# Patient Record
Sex: MALE | Race: WHITE | URBAN - METROPOLITAN AREA
[De-identification: names, ages, dates, MRNs, and addresses within clinical notes are randomized per-mention and may not be internally consistent; named-entity substitution may affect disease eponyms.]

---

## 2017-09-26 ENCOUNTER — NURSING HOME VISIT (OUTPATIENT)
Dept: GERIATRICS | Facility: CLINIC | Age: 82
End: 2017-09-26
Payer: COMMERCIAL

## 2017-09-26 VITALS
WEIGHT: 227.7 LBS | SYSTOLIC BLOOD PRESSURE: 126 MMHG | DIASTOLIC BLOOD PRESSURE: 88 MMHG | HEART RATE: 77 BPM | RESPIRATION RATE: 18 BRPM | TEMPERATURE: 98.4 F

## 2017-09-26 DIAGNOSIS — F17.291 OTHER TOBACCO PRODUCT NICOTINE DEPENDENCE IN REMISSION: ICD-10-CM

## 2017-09-26 DIAGNOSIS — R39.14 BENIGN PROSTATIC HYPERPLASIA WITH INCOMPLETE BLADDER EMPTYING: ICD-10-CM

## 2017-09-26 DIAGNOSIS — E11.49 CONTROLLED TYPE 2 DIABETES MELLITUS WITH OTHER NEUROLOGIC COMPLICATION: Primary | ICD-10-CM

## 2017-09-26 DIAGNOSIS — I10 BENIGN ESSENTIAL HYPERTENSION: ICD-10-CM

## 2017-09-26 DIAGNOSIS — Z86.73 HISTORY OF CVA (CEREBROVASCULAR ACCIDENT): ICD-10-CM

## 2017-09-26 DIAGNOSIS — K59.01 SLOW TRANSIT CONSTIPATION: ICD-10-CM

## 2017-09-26 DIAGNOSIS — N40.1 BENIGN PROSTATIC HYPERPLASIA WITH INCOMPLETE BLADDER EMPTYING: ICD-10-CM

## 2017-09-26 DIAGNOSIS — F51.02 ADJUSTMENT INSOMNIA: ICD-10-CM

## 2017-09-26 DIAGNOSIS — I69.919 COGNITIVE DEFICITS AS LATE EFFECT OF CEREBROVASCULAR DISEASE: ICD-10-CM

## 2017-09-26 PROBLEM — I63.81 RIGHT-SIDED LACUNAR INFARCTION (H): Status: ACTIVE | Noted: 2017-09-13

## 2017-09-26 PROBLEM — N17.9 ACUTE RENAL FAILURE (ARF) (H): Status: ACTIVE | Noted: 2017-09-13

## 2017-09-26 PROBLEM — R53.1 WEAKNESS: Status: ACTIVE | Noted: 2017-09-13

## 2017-09-26 PROCEDURE — 99310 SBSQ NF CARE HIGH MDM 45: CPT | Performed by: NURSE PRACTITIONER

## 2017-09-26 RX ORDER — GUAIFENESIN AND DEXTROMETHORPHAN HYDROBROMIDE 100; 10 MG/5ML; MG/5ML
5-10 SOLUTION ORAL EVERY 4 HOURS PRN
COMMUNITY
End: 2017-10-26

## 2017-09-26 RX ORDER — NICOTINE 21 MG/24HR
1 PATCH, TRANSDERMAL 24 HOURS TRANSDERMAL EVERY 24 HOURS
COMMUNITY
End: 2017-10-10 | Stop reason: DRUGHIGH

## 2017-09-26 RX ORDER — CHOLECALCIFEROL (VITAMIN D3) 25 MCG
2000 CAPSULE ORAL DAILY
COMMUNITY

## 2017-09-26 RX ORDER — POLYETHYLENE GLYCOL 3350 17 G/17G
17 POWDER, FOR SOLUTION ORAL DAILY
COMMUNITY

## 2017-09-26 RX ORDER — UREA 10 %
500 LOTION (ML) TOPICAL DAILY
COMMUNITY
End: 2017-10-06

## 2017-09-26 RX ORDER — TAMSULOSIN HYDROCHLORIDE 0.4 MG/1
0.4 CAPSULE ORAL DAILY
COMMUNITY
End: 2017-10-26

## 2017-09-26 NOTE — PROGRESS NOTES
"[place] [facility]  PRIMARY CARE PROVIDER AND CLINIC:  No primary care provider on file. No primary physician on file.- Pt unable to tell me who his PCP is or clinic.   Appears from Care everywhere Sam - [facility] in [place] - Camelia Cabral NP following him (mostly DMII visits)    Chief Complaint   Patient presents with     Hospital F/U     Clinic Care Coordination - Initial       HPI:    Ashok Lovett is a 82 year old  (1935),admitted to the [facility]   from Hospital  [facility].  Hospital stay 9/13/17 through 9/26/17. Pt was brought in to [place] ER on 9/13 by EMS after wife called after pt unable to get out of bed.Of note Pt has chronic gain imbalance and unsteadiness s/p back surgery years ago but this week pt \"passed out after falling out of bed 9/11\" 911 called and EMS got him in bed. Pt was resistant to go to the hospital at that time.   9/13 ER mild trop + and transferred to [facility]. [facility] w/u with MRI negative L cerebellar and R IC CVAs, Stres test and ROSI negative   Positive for but possible UTI with out a UA and cipro started and UR - flomax started with urology f/u and  + for Failure to thrive and cognitive deficits - needing therapy and likely 24 hr care. Pt did have sig delirium in hosp and psy consulted - encouraged to go in pt PSY but family didn't want.   Spent most of time in [facility] 1:1 sitter and adjusting anti psy meds for DC. Hospital DC summary not in [facility] and above obtained from reading many daily progress notes piecing together the story.      Admitted to this facility for  rehab, medical management and nursing care.  HPI information obtained from: facility chart records, facility staff, patient report and Care Everywhere [facility] chart review.  Current issues are:      Controlled type 2 diabetes mellitus with other neurologic complication (H)  On amaryl and well controlled with A1C of 7.8 on 9/13.     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   On ASA, PLAVIX for hx of CVA    Benign essential hypertension  On ACE 5    Benign prostatic hyperplasia with incomplete bladder emptying  Started on flomax with f/u   No bladder scanner at facility and unable to test.     Other tobacco product nicotine dependence in remission  On nicotine 21 mcg patches - but now institutionalized x 13 days and no smoking - states 1 pack of cigars over 4 days     Slow transit constipation  On miralax and colace.       CODE STATUS/ADVANCE DIRECTIVES DISCUSSION:   CPR/Full code   Patient's living condition: lives with spouse    ALLERGIES:Hmg-coa-r inhibitors  PAST MEDICAL HISTORY:  has no past medical history on file.  PAST SURGICAL HISTORY:  has no past surgical history on file.  FAMILY HISTORY: family history is not on file.  SOCIAL HISTORY:     Reviewed from Care everywhere in [facility]    Post Discharge Medication Reconciliation Status: discharge medications reconciled, continue medications without change.  Current Outpatient Prescriptions   Medication Sig Dispense Refill     Glimepiride (AMARYL PO) Take 2 mg by mouth daily (with breakfast)       ASPIRIN PO Take 81 mg by mouth daily       tamsulosin (FLOMAX) 0.4 MG capsule Take 0.4 mg by mouth daily       LISINOPRIL PO Take 5 mg by mouth daily       MELATONIN PO Take 3 mg by mouth At Bedtime       polyethylene glycol (MIRALAX/GLYCOLAX) powder Take 17 g by mouth daily       MIRTAZAPINE PO Take 7.5 mg by mouth At Bedtime       nicotine (NICODERM CQ) 21 MG/24HR 24 hr patch Place 1 patch onto the skin every 24 hours        Clopidogrel Bisulfate (PLAVIX PO) Take 75 mg by mouth       QUEtiapine Fumarate (SEROQUEL PO) Take 25 mg by mouth 3 times  daily as needed       TRAZODONE HCL PO Take 50 mg by mouth At Bedtime       Dextromethorphan-guaiFENesin  MG/5ML syrup Take 5-10 mLs by mouth every 4 hours as needed for cough       Acetaminophen (TYLENOL PO) Take 650 mg by mouth every 4 hours as needed for mild pain or fever       cyanocolbalamin (VITAMIN  B-12) 500 MCG tablet Take 500 mcg by mouth daily       Cholecalciferol (VITAMIN D-3) 1000 UNITS CAPS Take 2,000 Units by mouth daily         ROS:  4 point ROS including Respiratory, CV, GI and , other than that noted in the HPI,  is negative    Exam:  /88  Pulse 77  Temp 98.4  F (36.9  C)  Resp 18  Wt 227 lb 11.2 oz (103.3 kg)  GENERAL APPEARANCE:  Alert, in no distress  RESP:  respiratory effort and palpation of chest normal, auscultation of lungs diminished in deepali bases but clear - no ronchi but does have wet cough , no respiratory distress  CV:  Palpation and auscultation of heart done , rate and rhythm reg, 3/6 murmur, +1 deepali lower ext peripheral edema  ABDOMEN:  normal bowel sounds, soft, nontender, no hepatosplenomegaly or other masses  M/S:   Gait and station assist 1 now, Digits and nails normal  SKIN:  Inspection and Palpation of skin and subcutaneous tissue intact  NEURO: 2-12 in normal limits and at patient's baseline  PSYCH:  insight and judgement, memory confused , affect and mood- anxious/angry      Lab/Diagnostic data:  [place]    A1C 7.8 on 9/13    URINE CHEM STRIP WITH REFLEX TO MICROSCOPIC IF INDICATED (09/22/2017 1:39 AM)   Component Value Ref Range   COLOR YELLOW     RENUKA CLEAR     UR SPECIFIC GRAVITY 1.026 (H) 1.015 - 1.025   UR GLUCOSE NEGATIVE NEG   UR BILI NEGATIVE NEG   KETONE 1+ (A) NEG   UR OCCULT BLOOD NEGATIVE NEG   URINE PH 5.0 5.0 - 7.0   UR ALBUMIN NEGATIVE NEG   UROBIL <2.0 <2.0 EU/DL   NITRITE NEGATIVE NEG   LEUKO EST NEGATIVE NEG     VITAMIN B12 (09/16/2017 7:21 AM)   Component Value Ref Range   VITAMIN B12 743 208 - 964 PG/ML     WBC/HGB/PLT (09/16/2017 7:21  AM)   Component Value Ref Range   WBC COUNT 8.9 3.9 - 11.9 K/UL   HEMOGLOBIN 15.4 13.1 - 17.1 GM/DL   PLATELETS 258 179 - 450 K/UL     CHEMISTRY 8 TEST PANEL (09/16/2017 7:21 AM)   Component Value Ref Range   GLUCOSE 180 (H) 70 - 100 MG/DL   BLOOD UREA NITROGEN 12.7 10.0 - 20.0 MG/DL   CREATININE 1.01 0.72 - 1.25 MG/DL   GFR, EST (OTHER RACES) >60     GFR,EST() >60  Comment:   REFERENCE RANGE:  NORMAL  >60 ml/min/1.73m`2  GFR NOT CALCULATED IF <18 YEARS OLD         BUN/CREAT RATIO 12.6 11.7 - 22.9   CO2 25.0 22.0 - 29.0 MMOL/L   CHLORIDE 103 98 - 107 MMOL/L   SODIUM 140 136 - 146 MMOL/L   POTASSIUM 4.6 3.5 - 5.1 MMOL/L   CALCIUM 9.4 8.6 - 10.5 MG/DL   ANION GAP 16.6 10.0 - 20.0     LIPID PROFILE (09/14/2017 6:21 AM)   Component Value Ref Range   CHOLESTEROL 144 0 - 200 MG/DL   TRIGLYCERIDES 153 (H) 30 - 150 MG/DL   HDL CHOLESTEROL 31 (L) >40 MG/DL   CALCULATED VLDL 31 MG/DL   CALCULATED LDL 82  Comment:     ATP III GUIDELINES (LDL)  OPTIMAL:          <100 MG/DL  ABOVE OPTIMAL: 100-129 MG/DL  BORDERLINE HI: 130-159 MG/DL  HIGH:          160-189 MG/DL  VERY HIGH:        >190 MG/DL     0 - 159 MG/DL     CBC WITH DIFFERENTIAL AND PLATELET COUNT (09/14/2017 6:21 AM)   Component Value Ref Range   WBC COUNT 5.7 3.9 - 11.9 K/UL   RBC COUNT 4.84 4.08 - 5.79 M/UL   HEMOGLOBIN 14.5 13.1 - 17.1 GM/DL   HEMATOCRIT 42.8 38.7 - 51.4 %   MCV 88.5 82.9 - 100.6 FL   MCH 29.9 27.6 - 33.2 PG   MCHC 33.8 32.0 - 36.0 %   RDW 13.9 10.0 - 16.2 %   PLATELETS 216 179 - 450 K/UL   MPV 9.4 7.4 - 10.4 FL   % SEGS 53.0 43 - 80 %   % LYMPHS 23.0 16.0 - 49.0 %   % MONOCYTE 17.0 (H) 0.0 - 10.0 %   % EOS 6.0 0 - 7 %   % BASO 1.0 0 - 2 %   ABS NEUTROPHIL (ANC) 3.0 1.6 - 8.1 K/UL   ABS LYMPH 1.3 0.9 - 3.5 K/UL   ABS MONOCYTE 1.0 0.0 - 1.1 K/UL   ABS EOS 0.3 0.0 - 0.8 K/UL   ABS BASO 0.1 0.0 - 0.2 K/UL   RBC MORPHOLOGY NORMAL RBC MORPHOLOGY     PLATELET COMMENTS NORMAL PLATELET MORPHOLOGY     DIFF TYPE MANUAL DIFFERENTIAL          ASSESSMENT/PLAN:  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     Benign essential hypertension  Monitor - adjust as needed     Benign prostatic hyperplasia with incomplete bladder emptying  Cont poc with flomax and urology f/u    Other tobacco product nicotine dependence in remission  Will back down on patchs now - montior     slow transit constipation  DC colace as not effective - cont mirilax and clarissa if needed.        Orders:  1.  D/c Colace.  2.  Decrease Nicotine patch 21 mcg 1 patch transdermal QD.  Dx: nicotine dependence     Total time spent with patient visit at the skilled nursing facility was 40 including patient visit, review of past records and hosp/past health care review. Greater than 50% of total time spent with counseling and coordinating care due to different [facility] health care records review and his lack of memory    Electronically signed by:  JOSÉ MIGUEL Pathak CNP

## 2017-10-02 VITALS
OXYGEN SATURATION: 91 % | RESPIRATION RATE: 18 BRPM | SYSTOLIC BLOOD PRESSURE: 124 MMHG | TEMPERATURE: 98.5 F | HEART RATE: 81 BPM | DIASTOLIC BLOOD PRESSURE: 79 MMHG | WEIGHT: 225.9 LBS

## 2017-10-02 NOTE — PROGRESS NOTES
[place] [facility]    Chief Complaint   Patient presents with     Nursing Home Acute       HPI:    Ashok Lovett is a 82 year old  (1935), who is being seen today for an episodic care visit at [facility].    HPI information obtained from: facility chart records, facility staff, patient report, [facility] chart review, Care Everywhere Epic chart review and family/first contact wife report. Today's concern is:  History of CVA (cerebrovascular accident)/Cognitive deficits as late effect of cerebrovascular disease/Adjustment insomnia/Depression  Pt improving and having less delrium, but still confused.   Wife concerned - worried more about depression as he had [place] prior [place] acute cva/hospitalization and he noted + depression. She was interested in a antidepressant. I started celexa on 10/2.     Benign essential hypertension  Noted lower bp's inlight on CVA - want higher - only on ACE 5 mg    Advanced directives, counseling/discussion  Full code - talked to pt - he is clear dnr/dni  Talked to wife  -     Type 2 diabetes mellitus with other circulatory complication, without long-term current use of insulin (H)  F/u on amaryl - bgm below.     Vitamin B 12 deficiency  Noted elevated reading below    Other tobacco product nicotine dependence in remission  Was on 2 21 mcg patches in hosp - weaned down to 1 patch by me at TCU admission since on 2 since 3 weeks   Pt denies any withdrawal problems. Wife very concerned and has several questions about it.       REVIEW OF SYSTEMS:  4 point ROS including Respiratory, CV, GI and , other than that noted in the HPI,  is negative    /79  Pulse 81  Temp 98.5  F (36.9  C)  Resp 18  Wt 225 lb 14.4 oz (102.5 kg)  SpO2 91%   BP's     BGM        GENERAL APPEARANCE:  Alert, in no distress  LS are improved, cough can clear  CV - +1 [place]ina lower ext edema HRRR        ASSESSMENT/PLAN:  History of CVA (cerebrovascular accident)/Cognitive deficits as late effect  of cerebrovascular disease/Adjustment insomnia  Improving - will DC seroquel - no use   Will also decrease other current meds as wife things it is the meds that are making him less engaged in the day. - discussed options and agreed on the below and keeping celexa  Will also check vit b 12    Benign essential hypertension  Lower ace given low bp    Advanced directives, counseling/discussion  Talked to wife about CPR - what is means and probability of pt coming back to who he is now. 15 min conversation on the phone with wife - 10 of which about ACP and 6 min with pt in room about aCP. . She agrees with  (pt) about dnr/dni and polst filled out.     Type 2 diabetes mellitus with other circulatory complication, without long-term current use of insulin (H)  stable    Vitamin B 12 deficiency  Will recheck level    Other tobacco product nicotine dependence in remission  Wife agrees to cont only 21 mcg after discussion on why it is good practice to reduce after 3 weeks     Adjustment disorder with depressed mood  Cont celexa.        1.  D/c Seroquel.  2.  Decrease Lisinopril to 2.5 mg po QD.  Dx: HTN  3.  Decrease Trazodone to 25 mg po QHS.  Dx: insomnia  4.  Change code status to DNR/DNI.  5.  Please call lab and see if Vit B12 can be added on today's labs - if not please check 10/5.  Dx: Vit B12 deficiency       Advance Care Planning Discussion 10/3/2017. My MATHEWS met with pt in house and wife via phone today at the nursing home to discuss Advance Care Planning. Ashok Lovett does have decisional capacity and was present for this discussion.  Those present were informed of the voluntary nature of this discussion and wished to proceed.  The discussion included: code status options and decisions and goals of care. Total time spent with patient visit was 49 min  including patient visit, review of past records and phone call to patient contact. Greater than 50% of total time spent with counseling and  coordinating care due to acp   15 min conversation on the phone with wife - 10 of which about ACP and 6 min with pt in room about aCP alone - then more with other medical issues.        JOSÉ MIGUEL Pathak CNP

## 2017-10-03 ENCOUNTER — HOSPITAL LABORATORY (OUTPATIENT)
Dept: NURSING HOME | Facility: OTHER | Age: 82
End: 2017-10-03

## 2017-10-03 ENCOUNTER — NURSING HOME VISIT (OUTPATIENT)
Dept: GERIATRICS | Facility: CLINIC | Age: 82
End: 2017-10-03
Payer: COMMERCIAL

## 2017-10-03 DIAGNOSIS — F43.21 ADJUSTMENT DISORDER WITH DEPRESSED MOOD: ICD-10-CM

## 2017-10-03 DIAGNOSIS — E11.59 TYPE 2 DIABETES MELLITUS WITH OTHER CIRCULATORY COMPLICATION, WITHOUT LONG-TERM CURRENT USE OF INSULIN (H): ICD-10-CM

## 2017-10-03 DIAGNOSIS — E53.8 VITAMIN B 12 DEFICIENCY: ICD-10-CM

## 2017-10-03 DIAGNOSIS — F51.02 ADJUSTMENT INSOMNIA: ICD-10-CM

## 2017-10-03 DIAGNOSIS — I69.919 COGNITIVE DEFICITS AS LATE EFFECT OF CEREBROVASCULAR DISEASE: ICD-10-CM

## 2017-10-03 DIAGNOSIS — Z86.73 HISTORY OF CVA (CEREBROVASCULAR ACCIDENT): Primary | ICD-10-CM

## 2017-10-03 DIAGNOSIS — I10 BENIGN ESSENTIAL HYPERTENSION: ICD-10-CM

## 2017-10-03 DIAGNOSIS — F17.291 OTHER TOBACCO PRODUCT NICOTINE DEPENDENCE IN REMISSION: ICD-10-CM

## 2017-10-03 DIAGNOSIS — Z71.89 ADVANCED DIRECTIVES, COUNSELING/DISCUSSION: ICD-10-CM

## 2017-10-03 LAB
ANION GAP SERPL CALCULATED.3IONS-SCNC: 8 MMOL/L (ref 3–14)
BUN SERPL-MCNC: 11 MG/DL (ref 7–30)
CALCIUM SERPL-MCNC: 8.6 MG/DL (ref 8.5–10.1)
CHLORIDE SERPL-SCNC: 105 MMOL/L (ref 94–109)
CO2 SERPL-SCNC: 26 MMOL/L (ref 20–32)
CREAT SERPL-MCNC: 0.95 MG/DL (ref 0.66–1.25)
GFR SERPL CREATININE-BSD FRML MDRD: 76 ML/MIN/1.7M2
GLUCOSE SERPL-MCNC: 144 MG/DL (ref 70–99)
POTASSIUM SERPL-SCNC: 4.3 MMOL/L (ref 3.4–5.3)
SODIUM SERPL-SCNC: 139 MMOL/L (ref 133–144)

## 2017-10-03 PROCEDURE — 99310 SBSQ NF CARE HIGH MDM 45: CPT | Performed by: NURSE PRACTITIONER

## 2017-10-04 PROBLEM — E53.8 VITAMIN B 12 DEFICIENCY: Status: ACTIVE | Noted: 2017-10-04

## 2017-10-04 PROBLEM — E11.59 TYPE 2 DIABETES MELLITUS WITH OTHER CIRCULATORY COMPLICATION, WITHOUT LONG-TERM CURRENT USE OF INSULIN (H): Status: ACTIVE | Noted: 2017-10-04

## 2017-10-04 PROBLEM — Z86.73 HISTORY OF CVA (CEREBROVASCULAR ACCIDENT): Status: ACTIVE | Noted: 2017-10-04

## 2017-10-04 PROBLEM — I10 BENIGN ESSENTIAL HYPERTENSION: Status: ACTIVE | Noted: 2017-10-04

## 2017-10-04 PROBLEM — F51.02 ADJUSTMENT INSOMNIA: Status: ACTIVE | Noted: 2017-10-04

## 2017-10-04 PROBLEM — Z71.89 ADVANCED DIRECTIVES, COUNSELING/DISCUSSION: Status: ACTIVE | Noted: 2017-10-04

## 2017-10-04 PROBLEM — I69.919 COGNITIVE DEFICITS AS LATE EFFECT OF CEREBROVASCULAR DISEASE: Status: ACTIVE | Noted: 2017-10-04

## 2017-10-04 PROBLEM — F17.291: Status: ACTIVE | Noted: 2017-10-04

## 2017-10-05 LAB — VIT B12 SERPL-MCNC: 1002 PG/ML (ref 193–986)

## 2017-10-06 ENCOUNTER — TELEPHONE (OUTPATIENT)
Dept: GERIATRICS | Facility: CLINIC | Age: 82
End: 2017-10-06

## 2017-10-06 NOTE — TELEPHONE ENCOUNTER
Hospital Laboratory on 10/03/2017   Component Date Value Ref Range Status     Sodium 10/03/2017 139  133 - 144 mmol/L Final     Potassium 10/03/2017 4.3  3.4 - 5.3 mmol/L Final     Chloride 10/03/2017 105  94 - 109 mmol/L Final     Carbon Dioxide 10/03/2017 26  20 - 32 mmol/L Final     Anion Gap 10/03/2017 8  3 - 14 mmol/L Final     Glucose 10/03/2017 144* 70 - 99 mg/dL Final     Urea Nitrogen 10/03/2017 11  7 - 30 mg/dL Final     Creatinine 10/03/2017 0.95  0.66 - 1.25 mg/dL Final     GFR Estimate 10/03/2017 76  >60 mL/min/1.7m2 Final    Non  GFR Calc     GFR Estimate If Black 10/03/2017 >90  >60 mL/min/1.7m2 Final    African American GFR Calc     Calcium 10/03/2017 8.6  8.5 - 10.1 mg/dL Final     Vitamin B12 10/03/2017 1002* 193 - 986 pg/mL Final       See vit B 12   DC supplement - not needed.

## 2017-10-10 ENCOUNTER — NURSING HOME VISIT (OUTPATIENT)
Dept: GERIATRICS | Facility: CLINIC | Age: 82
End: 2017-10-10
Payer: COMMERCIAL

## 2017-10-10 VITALS
WEIGHT: 219.4 LBS | SYSTOLIC BLOOD PRESSURE: 111 MMHG | TEMPERATURE: 98 F | DIASTOLIC BLOOD PRESSURE: 76 MMHG | OXYGEN SATURATION: 91 % | HEART RATE: 81 BPM | RESPIRATION RATE: 18 BRPM

## 2017-10-10 DIAGNOSIS — F51.02 ADJUSTMENT INSOMNIA: ICD-10-CM

## 2017-10-10 DIAGNOSIS — I10 BENIGN ESSENTIAL HYPERTENSION: Primary | ICD-10-CM

## 2017-10-10 DIAGNOSIS — F17.291 OTHER TOBACCO PRODUCT NICOTINE DEPENDENCE IN REMISSION: ICD-10-CM

## 2017-10-10 PROCEDURE — 99309 SBSQ NF CARE MODERATE MDM 30: CPT | Performed by: NURSE PRACTITIONER

## 2017-10-10 RX ORDER — NICOTINE 21 MG/24HR
1 PATCH, TRANSDERMAL 24 HOURS TRANSDERMAL EVERY 24 HOURS
COMMUNITY
End: 2017-10-10

## 2017-10-10 NOTE — PROGRESS NOTES
[place] [facility]    Chief Complaint   Patient presents with     Nursing Home Acute       HPI:    Ashok Lovett is a 82 year old  (1935), who is being seen today for an episodic care visit at [facility].  HPI information obtained from: facility chart records, facility staff, patient report and [facility] chart review. Today's concern is:  Benign essential hypertension  F/u on ACE reduction from 5 to 2.5 this week - bp's remain low/stable    Adjustment insomnia  F/u on trazodone from 50 to 25 scheduled last week -   Remains on melatonin and now started on celexa   Pt states he is sleeping well.     Other tobacco product nicotine dependence in remission  F/u on nicotine patch reduction from 2 21 mcg patches placed in hosp to 1 21 mcg patch reduction at TCU admission and f/u today to see if further reduction can happen -   Noted pt does not have a patch on and when orders for last change happened a stop date was placed for 4 days ago and pt has been with out a patch for 4 days.   denies any desire for nicotine now.   Hx of chew and cigars      REVIEW OF SYSTEMS:  4 point ROS including Respiratory, CV, GI and , other than that noted in the HPI,  is negative    PMH/PSH reviewed in [facility] today      /76  Pulse 81  Temp 98  F (36.7  C)  Resp 18  Wt 219 lb 6.4 oz (99.5 kg)  SpO2 91%  GENERAL APPEARANCE:  Alert, in no distress  Up ambut with SBA  Non labored breathing   Calm, oriented.     ASSESSMENT/PLAN:  Benign essential hypertension  Cont poc with lower ace    Adjustment insomnia  Cont GDR of trazodone to prn to off    Other tobacco product nicotine dependence in remission  DC patch.        1.  Change Trazodone to 25 mg po QHS PRN x 14 days for sleep/insomnia.  2. DC nicotine patch as has had it off x 4 days and not craving any nicotine.     My Kan, JOSÉ MIGUEL CNP

## 2017-10-18 VITALS
OXYGEN SATURATION: 92 % | TEMPERATURE: 98.1 F | SYSTOLIC BLOOD PRESSURE: 129 MMHG | RESPIRATION RATE: 18 BRPM | DIASTOLIC BLOOD PRESSURE: 76 MMHG | WEIGHT: 216.6 LBS | HEART RATE: 76 BPM

## 2017-10-18 NOTE — PROGRESS NOTES
[place] [facility]  PRIMARY CARE PROVIDER AND CLINIC:  Camelia Bobo [facility] [place] [address] [address] / [place] [address]  Chief Complaint   Patient presents with     Hospital F/U     Clinic Care Coordination - Initial       HPI:    Ashok Lovett is a 82 year old  (1935),admitted to the [facility]   from Hospital  [facility].  Hospital stay 9/13/17 through 9/26/17.  Admitted to this facility for  rehab, medical management and nursing care.  HPI information obtained from: patient report and GNP at the facility.      Interval History:  - Pt with significant PMH of chronic gain imbalance and unsteadiness s/p back surgery years ago admitted to the above hospital for fall, trop elevated, negative stress test and ROSI  - Hospitalization was c/w urinary retention started on flomax, delirium, in-patient psych recommended, family declined, required 1:1    AT TCU:    - Delirium clearing up, Seroquel stopped, trazodone reduced to 25 mg  Prn. Reports is more focus now. Reports had a harsh experience at [facility]. Denies was aggressive but was trying to get out of there.   - HTN: ACE reduced due to low BP. No HA or dizziness.   - Depression: started on Celexa. Reports appetite is good, sleep is fine.   - Physical therapy: going on and feels making a progress.     CODE STATUS/ADVANCE DIRECTIVES DISCUSSION:   DNR / DNI    ALLERGIES:Hmg-coa-r inhibitors  PAST MEDICAL HISTORY:  chronic back pain syndrome, DM2, BPH, CVA  PAST SURGICAL HISTORY:  . back surgery on lumbar area  FAMILY HISTORY: father had colon cancer. Mother had PD.   SOCIAL HISTORY:  . Smoked few cigars.     Post Discharge Medication Reconciliation Status: discharge medications reconciled and changed, per note/orders (see AVS).  Current Outpatient Prescriptions   Medication Sig Dispense Refill     Citalopram Hydrobromide (CELEXA PO) Take 10 mg by mouth daily       Glimepiride (AMARYL PO) Take 2 mg by  mouth daily (with breakfast)       ASPIRIN PO Take 81 mg by mouth daily       tamsulosin (FLOMAX) 0.4 MG capsule Take 0.4 mg by mouth daily       LISINOPRIL PO Take 2.5 mg by mouth daily        MELATONIN PO Take 3 mg by mouth At Bedtime       polyethylene glycol (MIRALAX/GLYCOLAX) powder Take 17 g by mouth daily       MIRTAZAPINE PO Take 7.5 mg by mouth At Bedtime       Clopidogrel Bisulfate (PLAVIX PO) Take 75 mg by mouth       TRAZODONE HCL PO Take 25 mg by mouth nightly as needed        Dextromethorphan-guaiFENesin  MG/5ML syrup Take 5-10 mLs by mouth every 4 hours as needed for cough       Acetaminophen (TYLENOL PO) Take 650 mg by mouth every 4 hours as needed for mild pain or fever       Cholecalciferol (VITAMIN D-3) 1000 UNITS CAPS Take 2,000 Units by mouth daily         ROS:  10 point ROS of systems including Constitutional, Eyes, Respiratory, Cardiovascular, Gastroenterology, Genitourinary, Integumentary, Muscularskeletal, Psychiatric were all negative except for pertinent positives noted in my HPI.    Exam:  /76  Pulse 76  Temp 98.1  F (36.7  C)  Resp 18  Wt 216 lb 9.6 oz (98.2 kg)  SpO2 92%  GENERAL APPEARANCE:  in no distress, cooperative  ENT:  Mouth and posterior oropharynx normal, moist mucous . [place]  EYES:  EOM, conjunctivae, lids, pupils and irises normal  NECK:  No adenopathy,masses or thyromegaly  RESP:  respiratory effort and palpation of chest normal, lungs clear to auscultation , no respiratory distress  CV:  Palpation and auscultation of heart done , regular rate and rhythm, no murmur, rub, or gallop, no edema  ABDOMEN:  normal bowel sounds, soft, nontender, no hepatosplenomegaly or other masses  SKIN:  Inspection of skin and subcutaneous tissue baseline, Palpation of skin and subcutaneous tissue baseline  NEURO:   Cranial nerves 2-12 are normal tested and grossly at patient's baseline, no purposeful movement in upper and lower extremities  PSYCH:  oriented to AAOx Person,  place, month and day but not year. , affect and mood normal    Lab/Diagnostic data:   Last Basic Metabolic Panel:  Recent Labs   Lab Test  10/03/17   0625   NA  139   POTASSIUM  4.3   CHLORIDE  105   JOSIANE  8.6   CO2  26   BUN  11   CR  0.95   GLC  144*     [place]  A1C 7.8 on 9/13          URINE CHEM STRIP WITH REFLEX TO MICROSCOPIC IF INDICATED (09/22/2017 1:39 AM)   Component Value Ref Range   COLOR YELLOW      RENUKA CLEAR      UR SPECIFIC GRAVITY 1.026 (H) 1.015 - 1.025   UR GLUCOSE NEGATIVE NEG   UR BILI NEGATIVE NEG   KETONE 1+ (A) NEG   UR OCCULT BLOOD NEGATIVE NEG   URINE PH 5.0 5.0 - 7.0   UR ALBUMIN NEGATIVE NEG   UROBIL <2.0 <2.0 EU/DL   NITRITE NEGATIVE NEG   LEUKO EST NEGATIVE NEG           VITAMIN B12 (09/16/2017 7:21 AM)   Component Value Ref Range   VITAMIN B12 743 208 - 964 PG/ML           WBC/HGB/PLT (09/16/2017 7:21 AM)   Component Value Ref Range   WBC COUNT 8.9 3.9 - 11.9 K/UL   HEMOGLOBIN 15.4 13.1 - 17.1 GM/DL   PLATELETS 258 179 - 450 K/UL           CHEMISTRY 8 TEST PANEL (09/16/2017 7:21 AM)   Component Value Ref Range   GLUCOSE 180 (H) 70 - 100 MG/DL   BLOOD UREA NITROGEN 12.7 10.0 - 20.0 MG/DL   CREATININE 1.01 0.72 - 1.25 MG/DL   GFR, EST (OTHER RACES) >60      GFR,EST() >60  Comment:   REFERENCE RANGE:  NORMAL  >60 ml/min/1.73m`2  GFR NOT CALCULATED IF <18 YEARS OLD         BUN/CREAT RATIO 12.6 11.7 - 22.9   CO2 25.0 22.0 - 29.0 MMOL/L   CHLORIDE 103 98 - 107 MMOL/L   SODIUM 140 136 - 146 MMOL/L   POTASSIUM 4.6 3.5 - 5.1 MMOL/L   CALCIUM 9.4 8.6 - 10.5 MG/DL   ANION GAP 16.6 10.0 - 20.0           LIPID PROFILE (09/14/2017 6:21 AM)   Component Value Ref Range   CHOLESTEROL 144 0 - 200 MG/DL   TRIGLYCERIDES 153 (H) 30 - 150 MG/DL   HDL CHOLESTEROL 31 (L) >40 MG/DL   CALCULATED VLDL 31 MG/DL   CALCULATED LDL 82  Comment:     ATP III GUIDELINES (LDL)  OPTIMAL:          <100 MG/DL  ABOVE OPTIMAL: 100-129 MG/DL  BORDERLINE HI: 130-159 MG/DL  HIGH:          160-189 MG/DL  VERY  HIGH:        >190 MG/DL    0 - 159 MG/DL           CBC WITH DIFFERENTIAL AND PLATELET COUNT (09/14/2017 6:21 AM)   Component Value Ref Range   WBC COUNT 5.7 3.9 - 11.9 K/UL   RBC COUNT 4.84 4.08 - 5.79 M/UL   HEMOGLOBIN 14.5 13.1 - 17.1 GM/DL   HEMATOCRIT 42.8 38.7 - 51.4 %   MCV 88.5 82.9 - 100.6 FL   MCH 29.9 27.6 - 33.2 PG   MCHC 33.8 32.0 - 36.0 %   RDW 13.9 10.0 - 16.2 %   PLATELETS 216 179 - 450 K/UL   MPV 9.4 7.4 - 10.4 FL   % SEGS 53.0 43 - 80 %   % LYMPHS 23.0 16.0 - 49.0 %   % MONOCYTE 17.0 (H) 0.0 - 10.0 %   % EOS 6.0 0 - 7 %   % BASO 1.0 0 - 2 %   ABS NEUTROPHIL (ANC) 3.0 1.6 - 8.1 K/UL   ABS LYMPH 1.3 0.9 - 3.5 K/UL   ABS MONOCYTE 1.0 0.0 - 1.1 K/UL   ABS EOS 0.3 0.0 - 0.8 K/UL   ABS BASO 0.1 0.0 - 0.2 K/UL   RBC MORPHOLOGY NORMAL RBC MORPHOLOGY      PLATELET COMMENTS NORMAL PLATELET MORPHOLOGY      DIFF TYPE MANUAL DIFFERENTIAL          ASSESSMENT/PLAN:  Physical deconditioning  - Improving, can ambulate independently using a walker.       Type 2 diabetes mellitus with other circulatory complication, without long-term current use of insulin (H)  - No results found for: A1C  - controlled  - glimepiride is a long acting meds, and better to be avoided in elderly.     Benign essential hypertension  -   BP Readings from Last 3 Encounters:   10/18/17 129/76   10/10/17 111/76   10/02/17 124/79   - med adjusted, now on lisinopril 2.5 mg.   - Keep SBP> 130 mmHg and DBP > 65 mmHg (levels below these increase mortality as shown by standard studies and observations).     Adjustment insomnia  - doing fine, on melatonin, and trazodone (with a plan to stop).      Benign prostatic hyperplasia with incomplete bladder emptying  - on flomax, stable.     History of CVA (cerebrovascular accident)  - on asa and plavix, doing fine.           Orders:  - See above, otherwise, continue the rest of the current POC.     Electronically signed by:  Adis Garay MD

## 2017-10-19 ENCOUNTER — NURSING HOME VISIT (OUTPATIENT)
Dept: GERIATRICS | Facility: CLINIC | Age: 82
End: 2017-10-19
Payer: COMMERCIAL

## 2017-10-19 DIAGNOSIS — N40.1 BENIGN PROSTATIC HYPERPLASIA WITH INCOMPLETE BLADDER EMPTYING: ICD-10-CM

## 2017-10-19 DIAGNOSIS — E11.59 TYPE 2 DIABETES MELLITUS WITH OTHER CIRCULATORY COMPLICATION, WITHOUT LONG-TERM CURRENT USE OF INSULIN (H): ICD-10-CM

## 2017-10-19 DIAGNOSIS — R39.14 BENIGN PROSTATIC HYPERPLASIA WITH INCOMPLETE BLADDER EMPTYING: ICD-10-CM

## 2017-10-19 DIAGNOSIS — F51.02 ADJUSTMENT INSOMNIA: ICD-10-CM

## 2017-10-19 DIAGNOSIS — I10 BENIGN ESSENTIAL HYPERTENSION: ICD-10-CM

## 2017-10-19 DIAGNOSIS — R53.81 PHYSICAL DECONDITIONING: Primary | ICD-10-CM

## 2017-10-19 DIAGNOSIS — Z86.73 HISTORY OF CVA (CEREBROVASCULAR ACCIDENT): ICD-10-CM

## 2017-10-19 PROCEDURE — 99305 1ST NF CARE MODERATE MDM 35: CPT | Performed by: FAMILY MEDICINE

## 2017-10-19 PROCEDURE — 99207 ZZC CDG-CORRECTLY CODED, REVIEWED AND AGREE: CPT | Performed by: FAMILY MEDICINE

## 2017-10-23 ENCOUNTER — NURSING HOME VISIT (OUTPATIENT)
Dept: GERIATRICS | Facility: CLINIC | Age: 82
End: 2017-10-23
Payer: COMMERCIAL

## 2017-10-23 VITALS
OXYGEN SATURATION: 92 % | TEMPERATURE: 98.5 F | SYSTOLIC BLOOD PRESSURE: 94 MMHG | WEIGHT: 216.8 LBS | HEART RATE: 90 BPM | RESPIRATION RATE: 22 BRPM | DIASTOLIC BLOOD PRESSURE: 64 MMHG

## 2017-10-23 DIAGNOSIS — F51.02 ADJUSTMENT INSOMNIA: Primary | ICD-10-CM

## 2017-10-23 PROCEDURE — 99308 SBSQ NF CARE LOW MDM 20: CPT | Performed by: NURSE PRACTITIONER

## 2017-10-23 NOTE — PROGRESS NOTES
"[facility]    Chief Complaint   Patient presents with     Nursing Home Acute       HPI:    Ashok Lovett is a 82 year old  (1935), who is being seen today for an episodic care visit at [facility].    HPI information obtained from: facility chart records, facility staff, patient report, [facility] chart review and family/first contact wife report. Today's concern is:  Adjustment insomnia  Pt here rehabbing s/p [facility] change in condition found to be UTI related - needing 1:1 sitting and started on seroquel, trazodone, remeron and melatonin   In TCU we have weaned off seroquel - changed trazodone to prn - no use in 14 days and  Pt stable - looking to DC soon.   Wife wondering about further dose reductions.   No acute delrium anymore and sleeping well per pt/staff      REVIEW OF SYSTEMS:  4 point ROS including Respiratory, CV, GI and , other than that noted in the HPI,  is negative    BP 94/64  Pulse 90  Temp 98.5  F (36.9  C)  Resp 22  Wt 216 lb 12.8 oz (98.3 kg)  SpO2 92%  GENERAL APPEARANCE:  Alert, in no distress      ASSESSMENT/PLAN:  Adjustment insomnia  Stable with past dose reductions - will now trial off remeron   Noted DC of trazodone - but was only prn - and no use in 14 days.       1.  D/c \"d/c dates\" on melatonin.  2.  D/c Remeron.  3.  D/c Trazodone.      My Kan, APRN CNP  "

## 2017-10-26 ENCOUNTER — DISCHARGE SUMMARY NURSING HOME (OUTPATIENT)
Dept: GERIATRICS | Facility: CLINIC | Age: 82
End: 2017-10-26
Payer: COMMERCIAL

## 2017-10-26 VITALS
WEIGHT: 216.8 LBS | DIASTOLIC BLOOD PRESSURE: 90 MMHG | TEMPERATURE: 97.7 F | OXYGEN SATURATION: 96 % | HEART RATE: 73 BPM | SYSTOLIC BLOOD PRESSURE: 123 MMHG | RESPIRATION RATE: 16 BRPM

## 2017-10-26 DIAGNOSIS — F51.02 ADJUSTMENT INSOMNIA: ICD-10-CM

## 2017-10-26 DIAGNOSIS — N40.1 BENIGN PROSTATIC HYPERPLASIA WITH INCOMPLETE BLADDER EMPTYING: ICD-10-CM

## 2017-10-26 DIAGNOSIS — K59.01 SLOW TRANSIT CONSTIPATION: ICD-10-CM

## 2017-10-26 DIAGNOSIS — I69.919 COGNITIVE DEFICITS AS LATE EFFECT OF CEREBROVASCULAR DISEASE: ICD-10-CM

## 2017-10-26 DIAGNOSIS — F17.291 OTHER TOBACCO PRODUCT NICOTINE DEPENDENCE IN REMISSION: ICD-10-CM

## 2017-10-26 DIAGNOSIS — I10 BENIGN ESSENTIAL HYPERTENSION: ICD-10-CM

## 2017-10-26 DIAGNOSIS — F43.21 ADJUSTMENT DISORDER WITH DEPRESSED MOOD: ICD-10-CM

## 2017-10-26 DIAGNOSIS — R39.14 BENIGN PROSTATIC HYPERPLASIA WITH INCOMPLETE BLADDER EMPTYING: ICD-10-CM

## 2017-10-26 DIAGNOSIS — E11.59 TYPE 2 DIABETES MELLITUS WITH OTHER CIRCULATORY COMPLICATION, WITHOUT LONG-TERM CURRENT USE OF INSULIN (H): Primary | ICD-10-CM

## 2017-10-26 DIAGNOSIS — Z86.73 HISTORY OF CVA (CEREBROVASCULAR ACCIDENT): ICD-10-CM

## 2017-10-26 DIAGNOSIS — R53.1 GENERALIZED WEAKNESS: ICD-10-CM

## 2017-10-26 PROCEDURE — 99316 NF DSCHRG MGMT 30 MIN+: CPT | Performed by: NURSE PRACTITIONER

## 2017-10-26 RX ORDER — CITALOPRAM HYDROBROMIDE 10 MG/1
10 TABLET ORAL DAILY
Qty: 30 TABLET | Refills: 0 | Status: SHIPPED | OUTPATIENT
Start: 2017-10-26

## 2017-10-26 RX ORDER — TAMSULOSIN HYDROCHLORIDE 0.4 MG/1
0.4 CAPSULE ORAL DAILY
Qty: 30 CAPSULE | Refills: 0 | Status: SHIPPED | OUTPATIENT
Start: 2017-10-26

## 2017-10-26 NOTE — PROGRESS NOTES
[facility] DISCHARGE SUMMARY    PATIENT'S NAME: Ashok Lovett  YOB: 1935  MEDICAL RECORD NUMBER:  [id number]    PRIMARY CARE PROVIDER AND CLINIC RESPONSIBLE AFTER TRANSFER: Camelia Bobo [facility] [place] [address] / [place] [address]     CODE STATUS/ADVANCE DIRECTIVES DISCUSSION:   DNR / DNI       Allergies   Allergen Reactions     Hmg-Coa-R Inhibitors Muscle Pain (Myalgia)     Destroyed muscles       TRANSFERRING PROVIDERS: JOSÉ MIGUEL Pathak CNP, Adis Garay MD  DATE OF SNF ADMISSION:  September / 26 / 2017  DATE OF SNF (anticipated) DISCHARGE: October / 26 / 2017  DISCHARGE DISPOSITION: Non-FMG Provider   Nursing Facility: [facility] stay 9/13/17 to 9/26/17.     Condition on Discharge:  Improving.  Function:  indep  Cognitive Scores: BIMS 10/24/17 = 5/15    Equipment: walker    DISCHARGE DIAGNOSIS:   1. Type 2 diabetes mellitus with other circulatory complication, without long-term current use of insulin (H)    2. Cognitive deficits as late effect of cerebrovascular disease    3. History of CVA (cerebrovascular accident)    4. Adjustment insomnia    5. Generalized weakness    6. Benign essential hypertension    7. Benign prostatic hyperplasia with incomplete bladder emptying    8. Other tobacco product nicotine dependence in remission    9. Slow transit constipation    10. Adjustment disorder with depressed mood        HPI Nursing Facility Course:  HPI information obtained from: facility chart records, facility staff, patient report, [facility] chart review and family/first contact wfie report.    Hosp/TCU summary:   Pt admitted to the [facility]   from [facility] 9/13/17 through 9/26/17 after being brought in to [facility] on 9/13 by EMS as wife called after pt unable to get out of bed. Of note Pt has chronic gain imbalance and unsteadiness s/p back surgery years  "ago but this week pt \"passed out after falling out of bed 9/11\" 911 called and EMS got him in bed. Pt was resistant to go to the hospital at that time.   9/13 ER mild trop + and transferred to [facility]. [facility] w/u with MRI negative but noted old L cerebellar and R IC CVAs, Stres test and ROSI negative,  Positive for possible UTI with out a UA and cipro started and UR - flomax started with urology f/u and  + for  cognitive deficits - needing therapy and likely 24 hr care. Pt did have sig delirium in hosp and psy consulted - encouraged to go in pt PSY but family didn't want.   Spent most of time in [place] 1:1 sitter and adjusting anti psy meds for DC.  At TCU pt improved significantly - weaned down on medications and pt stabilized with cog impairments and improved with rehab. See details below.   Cognitive deficits as late effect of cerebrovascular disease/  History of CVA (cerebrovascular accident)/Generalized Weakness/Adjustment insomnia/Depression  Treating with therapy, cog assessment: BIMS 5/15, 7/15, 8/15 (from most recent to TCU admit - noted decline in testing scores)   Medications to keep stable mood/sleep from hospital: melatonin, remeron, Trazodadone and seroquel -In TCU weaned to Dc'd seroquel, trazodone and remeron. Still on melatonin and sleeping well. No psychosis. Baseline cog deficits - and family to provide care.   On ASA, PLAVIX for hx of CVA  Noted pre hosp depression scores + per wife and pt started on Celexa in TCU - stable - helping mood.     Controlled type 2 diabetes mellitus with other neurologic complication (H)  On amaryl and well controlled with A1C of 7.8 on 9/13.   BGM at TCU    No changes made.       Benign essential hypertension  ACE 2.5 - decreased from 5 mg on 10/3 due to low bp's- stable - f/u with PCP -Based on JNC-8 goals,  patients age of 82 year old, presence of diabetes or CKD, and goals of care goal BP is  <140/90 mm Hg.        Benign prostatic hyperplasia with " incomplete bladder emptying  Started on flomax in hosp. STable at TCU. No bladder scanner at facility and unable to test.      Other tobacco product nicotine dependence in remission  Treated in hosp/TCU with nicotine patches - started at two 21 mcg patches  To now off. - stable.       PAST MEDICAL HISTORY:  has no past medical history on file.at [facility] - reviewed from Care everywhere.     DISCHARGE MEDICATIONS:  Current Outpatient Prescriptions   Medication Sig Dispense Refill     citalopram (CELEXA) 10 MG tablet Take 1 tablet (10 mg) by mouth daily 30 tablet 0     tamsulosin (FLOMAX) 0.4 MG capsule Take 1 capsule (0.4 mg) by mouth daily 30 capsule 0     Glimepiride (AMARYL PO) Take 2 mg by mouth daily (with breakfast)       ASPIRIN PO Take 81 mg by mouth daily       LISINOPRIL PO Take 2.5 mg by mouth daily        MELATONIN PO Take 3 mg by mouth At Bedtime       polyethylene glycol (MIRALAX/GLYCOLAX) powder Take 17 g by mouth daily       Clopidogrel Bisulfate (PLAVIX PO) Take 75 mg by mouth       Cholecalciferol (VITAMIN D-3) 1000 UNITS CAPS Take 2,000 Units by mouth daily         MEDICATION CHANGES/RATIONALE:   1. Wean to DC seroquel as no needed - stable  2. WEan to dC Trazodone as not needed - stable  3. Wean to DC remeron as not needed -s table  4. STarted Celexa for depression per wife - stable.   5. Decreased lisinopril from 5 to 2.5 due to low bp's    - scripts for celexa and flomax sent to [facility] in [place] for 30 day supply - then refill from PCP.       Controlled medications sent with patient: not applicable/none     ROS:    4 point ROS including Respiratory, CV, GI and , other than that noted in the HPI,  is negative    Physical Exam:   Vitals: /90  Pulse 73  Temp 97.7  F (36.5  C)  Resp 16  Wt 216 lb 12.8 oz (98.3 kg)  SpO2 96%  BMI= There is no height or weight on file to calculate BMI.    A & O x 3, NAD. Lungs CTA, non labored. HR reg, trace deepali lwoeredema.  Abdomen soft, nontender. No  focal neurological deficits. FRIEDMAN - up ambulating sba      DISCHARGE PLAN:  Occupational Therapy, Physical Therapy, Registered Nurse and Home Health Aide  Patient instructed to follow-up with:  PCP in 7 days      MTM referral needed and placed by this provider: No    Pending labs: none  Last Basic Metabolic Panel:  Recent Labs   Lab Test  10/03/17   0625   NA  139   POTASSIUM  4.3   CHLORIDE  105   JOSIANE  8.6   CO2  26   BUN  11   CR  0.95   GLC  144*     Discharge Treatments:none    TOTAL DISCHARGE TIME:   Greater than 30 minutes  Electronically signed by:  JOSÉ MIGUEL Pathak CNP         Documentation of Face-to-Face and Certification for Home Health Services     Patient: Ashok Lovett   YOB: 1935  MR Number: [id number]  Today's Date: 10/26/2017    I certify that patient: Ashok Lovett is under my care and that I, or a nurse practitioner or physician's assistant working with me, had a face-to-face encounter that meets the physician face-to-face encounter requirements with this patient on: 10/26/2017.    This encounter with the patient was in whole, or in part, for the following medical condition, which is the primary reason for home health care:    Type 2 diabetes mellitus with other circulatory complication, without long-term current use of insulin (H)  Cognitive deficits as late effect of cerebrovascular disease  History of CVA (cerebrovascular accident)  Adjustment insomnia  Generalized weakness  Benign essential hypertension  Benign prostatic hyperplasia with incomplete bladder emptying  Other tobacco product nicotine dependence in remission  Slow transit constipation  Adjustment disorder with depressed mood  .    I certify that, based on my findings, the following services are medically necessary home health services: Nursing, Occupational Therapy and Physical Therapy.    My clinical findings support the need for the above services because: Nurse is needed: To provide assessment and oversight  required in the home to assure adherence to the medical plan due to: med mangemetn.. and Physical Therapy Services are needed to assess and treat the following functional impairments: weakness.    Further, I certify that my clinical findings support that this patient is homebound (i.e. absences from home require considerable and taxing effort and are for medical reasons or [employer] services or infrequently or of short duration when for other reasons) because: Requires assistance of another person or specialized equipment to access medical services because patient: Is prone to wander/get lost without assistance...    Based on the above findings. I certify that this patient is confined to the home and needs intermittent skilled nursing care, physical therapy and/or speech therapy.  The patient is under my care, and I have initiated the establishment of the plan of care.  This patient will be followed by a physician who will periodically review the plan of care.  Physician/Provider to provide follow up care: Camelia Bobo    Responsible Medicare certified PECOS : JOSÉ MIGUEL Pathak CNP  Date: 10/26/2017    Electronically signed: Adis Garay MD  Date: 10/26/17

## 2024-06-17 PROBLEM — Z71.89 ADVANCED DIRECTIVES, COUNSELING/DISCUSSION: Status: RESOLVED | Noted: 2017-10-04 | Resolved: 2024-06-17
